# Patient Record
Sex: FEMALE | Race: BLACK OR AFRICAN AMERICAN | Employment: FULL TIME | URBAN - METROPOLITAN AREA
[De-identification: names, ages, dates, MRNs, and addresses within clinical notes are randomized per-mention and may not be internally consistent; named-entity substitution may affect disease eponyms.]

---

## 2023-01-18 ENCOUNTER — HOSPITAL ENCOUNTER (EMERGENCY)
Age: 23
Discharge: HOME | End: 2023-01-18
Attending: STUDENT IN AN ORGANIZED HEALTH CARE EDUCATION/TRAINING PROGRAM
Payer: COMMERCIAL

## 2023-01-18 VITALS
SYSTOLIC BLOOD PRESSURE: 133 MMHG | TEMPERATURE: 98.3 F | HEART RATE: 95 BPM | RESPIRATION RATE: 18 BRPM | DIASTOLIC BLOOD PRESSURE: 71 MMHG | OXYGEN SATURATION: 95 %

## 2023-01-18 DIAGNOSIS — R51.9 ACUTE NONINTRACTABLE HEADACHE, UNSPECIFIED HEADACHE TYPE: ICD-10-CM

## 2023-01-18 DIAGNOSIS — J02.9 SORE THROAT: ICD-10-CM

## 2023-01-18 DIAGNOSIS — L73.9 ACUTE FOLLICULITIS: Primary | ICD-10-CM

## 2023-01-18 DIAGNOSIS — D50.9 IRON DEFICIENCY ANEMIA, UNSPECIFIED IRON DEFICIENCY ANEMIA TYPE: ICD-10-CM

## 2023-01-18 LAB
ALBUMIN SERPL-MCNC: 4 G/DL (ref 3.5–5)
ALBUMIN/GLOB SERPL: 0.9 (ref 1.1–2.2)
ALP SERPL-CCNC: 48 U/L (ref 45–117)
ALT SERPL-CCNC: 22 U/L (ref 12–78)
ANION GAP SERPL CALC-SCNC: 7 MMOL/L (ref 5–15)
APPEARANCE UR: ABNORMAL
AST SERPL-CCNC: 30 U/L (ref 15–37)
BACTERIA URNS QL MICRO: NEGATIVE /HPF
BASOPHILS # BLD: 0 K/UL (ref 0–0.1)
BASOPHILS NFR BLD: 0 % (ref 0–1)
BILIRUB SERPL-MCNC: 0.3 MG/DL (ref 0.2–1)
BILIRUB UR QL: NEGATIVE
BUN SERPL-MCNC: 6 MG/DL (ref 6–20)
BUN/CREAT SERPL: 7 (ref 12–20)
CALCIUM SERPL-MCNC: 8.8 MG/DL (ref 8.5–10.1)
CHLORIDE SERPL-SCNC: 103 MMOL/L (ref 97–108)
CLUE CELLS VAG QL WET PREP: NORMAL
CO2 SERPL-SCNC: 24 MMOL/L (ref 21–32)
COLOR UR: YELLOW
COMMENT, HOLDF: NORMAL
CREAT SERPL-MCNC: 0.85 MG/DL (ref 0.55–1.02)
DIFFERENTIAL METHOD BLD: ABNORMAL
EOSINOPHIL # BLD: 0 K/UL (ref 0–0.4)
EOSINOPHIL NFR BLD: 0 % (ref 0–7)
EPITH CASTS URNS QL MICRO: ABNORMAL /LPF
ERYTHROCYTE [DISTWIDTH] IN BLOOD BY AUTOMATED COUNT: 18.8 % (ref 11.5–14.5)
GLOBULIN SER CALC-MCNC: 4.7 G/DL (ref 2–4)
GLUCOSE SERPL-MCNC: 103 MG/DL (ref 65–100)
GLUCOSE UR STRIP.AUTO-MCNC: NEGATIVE MG/DL
HCG UR QL: NEGATIVE
HCT VFR BLD AUTO: 28.2 % (ref 35–47)
HGB BLD-MCNC: 7.5 G/DL (ref 11.5–16)
HGB UR QL STRIP: ABNORMAL
HYALINE CASTS URNS QL MICRO: ABNORMAL /LPF (ref 0–5)
IMM GRANULOCYTES # BLD AUTO: 0 K/UL (ref 0–0.04)
IMM GRANULOCYTES NFR BLD AUTO: 0 % (ref 0–0.5)
KETONES UR QL STRIP.AUTO: NEGATIVE MG/DL
KOH PREP SPEC: NORMAL
LEUKOCYTE ESTERASE UR QL STRIP.AUTO: ABNORMAL
LYMPHOCYTES # BLD: 1.5 K/UL (ref 0.8–3.5)
LYMPHOCYTES NFR BLD: 18 % (ref 12–49)
MCH RBC QN AUTO: 17 PG (ref 26–34)
MCHC RBC AUTO-ENTMCNC: 26.6 G/DL (ref 30–36.5)
MCV RBC AUTO: 63.8 FL (ref 80–99)
MONOCYTES # BLD: 0.7 K/UL (ref 0–1)
MONOCYTES NFR BLD: 8 % (ref 5–13)
NEUTS SEG # BLD: 6.4 K/UL (ref 1.8–8)
NEUTS SEG NFR BLD: 74 % (ref 32–75)
NITRITE UR QL STRIP.AUTO: NEGATIVE
NRBC # BLD: 0 K/UL (ref 0–0.01)
NRBC BLD-RTO: 0 PER 100 WBC
PH UR STRIP: 6.5 (ref 5–8)
PLATELET # BLD AUTO: 304 K/UL (ref 150–400)
PLATELET COMMENTS,PCOM: ABNORMAL
POTASSIUM SERPL-SCNC: 3.2 MMOL/L (ref 3.5–5.1)
PROT SERPL-MCNC: 8.7 G/DL (ref 6.4–8.2)
PROT UR STRIP-MCNC: 30 MG/DL
RBC # BLD AUTO: 4.42 M/UL (ref 3.8–5.2)
RBC #/AREA URNS HPF: >100 /HPF (ref 0–5)
RBC MORPH BLD: ABNORMAL
SAMPLES BEING HELD,HOLD: NORMAL
SERVICE CMNT-IMP: NORMAL
SODIUM SERPL-SCNC: 134 MMOL/L (ref 136–145)
SP GR UR REFRACTOMETRY: 1.01 (ref 1–1.03)
T VAGINALIS VAG QL WET PREP: NORMAL
UR CULT HOLD, URHOLD: NORMAL
UROBILINOGEN UR QL STRIP.AUTO: 0.2 EU/DL (ref 0.2–1)
WBC # BLD AUTO: 8.6 K/UL (ref 3.6–11)
WBC URNS QL MICRO: >100 /HPF (ref 0–4)

## 2023-01-18 PROCEDURE — 36415 COLL VENOUS BLD VENIPUNCTURE: CPT

## 2023-01-18 PROCEDURE — 87210 SMEAR WET MOUNT SALINE/INK: CPT

## 2023-01-18 PROCEDURE — 99284 EMERGENCY DEPT VISIT MOD MDM: CPT

## 2023-01-18 PROCEDURE — 74011250636 HC RX REV CODE- 250/636: Performed by: NURSE PRACTITIONER

## 2023-01-18 PROCEDURE — 74011000250 HC RX REV CODE- 250: Performed by: NURSE PRACTITIONER

## 2023-01-18 PROCEDURE — 96374 THER/PROPH/DIAG INJ IV PUSH: CPT

## 2023-01-18 PROCEDURE — 81001 URINALYSIS AUTO W/SCOPE: CPT

## 2023-01-18 PROCEDURE — 87491 CHLMYD TRACH DNA AMP PROBE: CPT

## 2023-01-18 PROCEDURE — 85025 COMPLETE CBC W/AUTO DIFF WBC: CPT

## 2023-01-18 PROCEDURE — 80053 COMPREHEN METABOLIC PANEL: CPT

## 2023-01-18 PROCEDURE — 81025 URINE PREGNANCY TEST: CPT

## 2023-01-18 PROCEDURE — 96361 HYDRATE IV INFUSION ADD-ON: CPT

## 2023-01-18 PROCEDURE — 74011250637 HC RX REV CODE- 250/637

## 2023-01-18 RX ORDER — LIDOCAINE HYDROCHLORIDE 20 MG/ML
JELLY TOPICAL
Status: COMPLETED | OUTPATIENT
Start: 2023-01-18 | End: 2023-01-18

## 2023-01-18 RX ORDER — ACETAMINOPHEN 500 MG
1000 TABLET ORAL ONCE
Status: COMPLETED | OUTPATIENT
Start: 2023-01-18 | End: 2023-01-18

## 2023-01-18 RX ORDER — CEPHALEXIN 500 MG/1
500 CAPSULE ORAL 4 TIMES DAILY
Qty: 28 CAPSULE | Refills: 0 | Status: SHIPPED | OUTPATIENT
Start: 2023-01-18 | End: 2023-01-25

## 2023-01-18 RX ORDER — MUPIROCIN 20 MG/G
OINTMENT TOPICAL DAILY
Qty: 22 G | Refills: 0 | Status: SHIPPED | OUTPATIENT
Start: 2023-01-18

## 2023-01-18 RX ORDER — KETOROLAC TROMETHAMINE 30 MG/ML
30 INJECTION, SOLUTION INTRAMUSCULAR; INTRAVENOUS
Status: COMPLETED | OUTPATIENT
Start: 2023-01-18 | End: 2023-01-18

## 2023-01-18 RX ADMIN — KETOROLAC TROMETHAMINE 30 MG: 30 INJECTION, SOLUTION INTRAMUSCULAR; INTRAVENOUS at 20:10

## 2023-01-18 RX ADMIN — LIDOCAINE HYDROCHLORIDE: 20 JELLY TOPICAL at 21:39

## 2023-01-18 RX ADMIN — ACETAMINOPHEN 1000 MG: 500 TABLET, FILM COATED ORAL at 20:09

## 2023-01-18 RX ADMIN — SODIUM CHLORIDE 1000 ML: 9 INJECTION, SOLUTION INTRAVENOUS at 20:07

## 2023-01-18 NOTE — Clinical Note
[address]  [address] [facility] [address]  [phone]    Work/School Note    Date: 1/18/2023    To Whom It May concern:    Aysha Velasquez was seen and treated today in the emergency room by the following provider(s):  Attending Provider: Carolina Franklin MD  Physician Assistant: Geno Duque PA-C. Aysha Velasquez is excused from work/school on 01/18/23 and 01/19/23. She is medically clear to return to work/school on 1/20/2023.        Sincerely,          Yonatan Parra PA-C

## 2023-01-18 NOTE — ED TRIAGE NOTES
She arrives from [facility] with blood work. She went there for swelling of her perineum. She says she also has a sore throat. She was sent her for a Hg of 7.1. She has a low grade fever in triage. She is on her period now. She says she does have heavy periods.

## 2023-01-18 NOTE — Clinical Note
[address]  [address] [facility] [address]  [phone]    Work/School Note    Date: 1/18/2023    To Whom It May concern:      Barbie Dupont was seen and treated today in the emergency room by the following provider(s):  Attending Provider: Darion Huang MD  Physician Assistant: Tod Garcia PA-C. Barbie Dupont is excused from work/school on 01/18/23. She is clear to return to work/school on 01/19/23.         Sincerely,          Navjot Sanford PA-C

## 2023-01-19 NOTE — DISCHARGE INSTRUCTIONS
Today you were evaluated for headache, sore throat, vaginal irritation/burning, abdominal pain. Emergent conditions such as pelvic inflammatory disease and ectopic pregnancy were ruled out. You have been diagnosed with vulvar folliculitis, we will add oral and topical antibiotics due to your established antibiotic regimen. Apply mupirocin ointment to the affected area once a day. Follow attached instructions for supportive care of folliculitis. Please refrain from sexual intercourse for 2 weeks. Do not use any new products on your vulvar area. You may use cough drops, nasal sprays, and oral analgesics for sore throat. You can take Tylenol or ibuprofen for headaches. Your potassium was mildly low on blood work, go home and eat at least 2 bananas a day. Your blood work also showed anemia, which is improving from your previous number. Your blood number should continue to improve as you progress to your menstrual cycle. Please return to the ED if symptoms worsen or you experience new worrisome symptoms. Thank you for allowing us to provide you with medical care today. We realize that you have many choices for your emergency care needs. We thank you for choosing [facility]. Please choose us in the future for any continued health care needs. The exam and treatment you received in the Emergency Department were for an emergent problem and are not intended as complete care. It is important that you follow up with a doctor, nurse practitioner, or physician's assistant for ongoing care. If your symptoms worsen or you do not improve as expected and you are unable to reach your usual health care provider, you should return to the Emergency Department. We are available 24 hours a day. Please make an appointment with your health care provider(s) for follow up of your Emergency Department visit. Take this sheet with you when you go to your follow-up visit.

## 2023-01-19 NOTE — ED PROVIDER NOTES
Patient is a 58-year-old female with no pertinent medical history presenting after referral from [facility]. Patient had been experiencing burning on urination and \"red bumps\" in her vulvar area. She was seen at an urgent care 4 days ago and diagnosed with bacterial vaginosis, prescribed Bactrim and Flagyl. She returned to the urgent care today because she is having a headache and the urgent care did blood work and her H/H came back low at which point she was referred to the emergency department. Patient reports that she began her menstrual cycle today and usually experiences heavy flow. Patient reports headache is one-sided, behind the left forehead, gradual in onset, with no associated photophobia, nausea, vomiting, visual changes. Patient took Tylenol for her headache which did not help. Also reports a sore throat which started yesterday. She denies difficulty swallowing, cough, sick contacts, voice changes fevers. She also endorses lower abdominal pain which is consistent with her usual menstrual cramps. Referral / Consult  Associated symptoms include headaches. Pertinent negatives include no chest pain, no abdominal pain and no shortness of breath. Anemia  Associated symptoms include headaches. Pertinent negatives include no chest pain, no abdominal pain and no shortness of breath. Vaginal Discharge   Pertinent negatives include no fever, no abdominal pain and no dysuria. Sore Throat   Associated symptoms include headaches. Pertinent negatives include no congestion and no shortness of breath. History reviewed. No pertinent past medical history. History reviewed. No pertinent surgical history. No family history on file.     Social History     Socioeconomic History    Marital status: SINGLE     Spouse name: Not on file    Number of children: Not on file    Years of education: Not on file    Highest education level: Not on file   Occupational History    Not on file Tobacco Use    Smoking status: Passive Smoke Exposure - Never Smoker    Smokeless tobacco: Not on file   Substance and Sexual Activity    Alcohol use: Not on file    Drug use: Not on file    Sexual activity: Not on file   Other Topics Concern    Not on file   Social History Narrative    Not on file     Social Determinants of Health     Financial Resource Strain: Not on file   Food Insecurity: Not on file   Transportation Needs: Not on file   Physical Activity: Not on file   Stress: Not on file   Social Connections: Not on file   Intimate Partner Violence: Not on file   Housing Stability: Not on file         ALLERGIES: Patient has no known allergies. Review of Systems   Constitutional:  Negative for fever. HENT:  Positive for sore throat. Negative for congestion. Eyes:  Negative for pain. Respiratory:  Negative for shortness of breath. Cardiovascular:  Negative for chest pain. Gastrointestinal:  Negative for abdominal pain. Genitourinary:  Positive for vaginal discharge and vaginal pain. Negative for dysuria. Skin:  Negative for color change. Neurological:  Positive for headaches. Negative for weakness and numbness. Psychiatric/Behavioral:  Negative for behavioral problems. Vitals:    01/18/23 1846   BP: 128/68   Pulse: (!) 112   Resp: 16   Temp: 100.1 °F (37.8 °C)   SpO2: 100%            Physical Exam  Vitals reviewed. Constitutional:       General: She is not in acute distress. Appearance: Normal appearance. She is well-developed. She is not ill-appearing. HENT:      Head: Normocephalic and atraumatic. Right Ear: Tympanic membrane and external ear normal.      Left Ear: Tympanic membrane and external ear normal.      Nose: Nose normal.      Mouth/Throat:      Mouth: Mucous membranes are moist.      Pharynx: Oropharynx is clear. Uvula midline. Posterior oropharyngeal erythema present. No pharyngeal swelling, oropharyngeal exudate or uvula swelling.       Tonsils: No tonsillar exudate or tonsillar abscesses. Eyes:      Extraocular Movements: Extraocular movements intact. Conjunctiva/sclera: Conjunctivae normal.      Pupils: Pupils are equal, round, and reactive to light. Cardiovascular:      Rate and Rhythm: Tachycardia present. Heart sounds: Normal heart sounds. Pulmonary:      Effort: Pulmonary effort is normal. No respiratory distress. Breath sounds: Normal breath sounds. Abdominal:      General: Abdomen is flat. There is no distension. Palpations: Abdomen is soft. Genitourinary:     Vagina: No vaginal discharge. Comments: Menstrual blood noted on speculum exam without purulent discharge. Irritated erythematous vulva. Significant discomfort upon insertion of speculum. No cervical motion tenderness. No cervical petechiae or discharge from cervix. Musculoskeletal:         General: Normal range of motion. Cervical back: Normal range of motion and neck supple. Lymphadenopathy:      Cervical: No cervical adenopathy. Skin:     General: Skin is warm and dry. Neurological:      General: No focal deficit present. Mental Status: She is alert and oriented to person, place, and time. Psychiatric:         Mood and Affect: Mood normal.         Behavior: Behavior normal.        Medical Decision Making  Differentials: PID, HSV, viral pharyngitis, iron deficiency anemia, mittelschmerz, migraine headache    78-year-old female with no medical history presenting after referral from urgent care. Referred here due to low H/H. Patient started her menstrual period today and her flow is usually heavy. Endorses headache, sore throat, abdominal pain, vaginal pain. No dysphagia, voice changes, shortness of breath, chest pain, cough, dizziness, visual changes, purulent vaginal discharge. Currently undergoing treatment for BV including Bactrim and Flagyl.   Vital signs remarkable for temperature of 100.1 and a pulse of 112 but were otherwise within normal limits. No abdominal tenderness to palpation, throat is erythematous without exudate or tonsillar edema. Cardiovascular and pulmonary exams normal.  No conjunctival pallor. Pulses +2 and symmetric bilaterally. Vulva appeared erythematous and excoriated and was tender on speculum exam.  Moderate menstrual blood noted on pelvic exam. No cervical motion tenderness or cervical discharge or petechiae. Will order urine pregnancy, UA, KOH, chlamydia/gonorrhea test, CMP, CBC, and will send sample to blood bank. Will give 1 L normal saline infusion, 30 mg Toradol injection, Tylenol 1000 mg, and topical Urojet/Glydo. Will do pelvic exam and send swabs for testing. We will recheck vitals. Will recommend patient follow-up with OB/GYN as soon as possible. KOH test was negative for yeast, wet prep was negative for trichomonas or clue cells, urine pregnancy was negative. Urinalysis showed large leukocyte burden, was still cloudy in appearance. CBC showed a hemoglobin of 7.5 with hematocrit of 28.2 with an MCV of 63.8. CMP showed a sodium of 134 and a potassium of 3.2. Reassured patient that anemia is likely due to to her heavy menstrual period, as her H&H has increased from its value from the urgent care this morning. Discussed red flag signs of anemia and return precautions. Will urge patient to eat a banana or 2 each day to replete potassium. Applied Urojet/Glydo 2% jelly. Will discharge patient home with mupirocin ointment and Keflex to cover folliculitis. Upon recheck vital signs were within normal limits including a temperature of 98.3 and a heart rate of 95. Discussed my clinical impression(s), any labs and/or radiology results with the patient. I answered any questions and addressed any concerns. Discussed the importance of following up with their primary care physician and/or specialist(s). Discussed signs or symptoms that would warrant return back to the ER for further evaluation.  The patient is agreeable with discharge. Please note that this dictation was completed with [employer], the computer voice recognition software. Quite often unanticipated grammatical, syntax, homophones, and other interpretive errors are inadvertently transcribed by the computer software. Please disregard these errors. Please excuse any errors that have escaped final proofreading. Amount and/or Complexity of Data Reviewed  Labs: ordered. Risk  OTC drugs. Prescription drug management. Pelvic Exam    Date/Time: 1/19/2023 2:13 AM  Performed by: PA  Procedure duration:  5 minutes. Documented by:  Myself. As dictated by:  Myself  Exam assisted by: Javier Saab NP. Type of exam performed: speculum. External genitalia appearance: swelling. Vaginal exam:  bleeding and inflammation. Bleeding: moderate  Cervical exam:  no cervical motion tenderness and os closed. Specimen(s) collected:  chlamydia and GC.   Patient tolerance: patient tolerated the procedure well with no immediate complications

## 2023-01-21 LAB
C TRACH RRNA SPEC QL NAA+PROBE: NEGATIVE
N GONORRHOEA RRNA SPEC QL NAA+PROBE: NEGATIVE
SPECIMEN SOURCE: NORMAL